# Patient Record
Sex: MALE | Race: WHITE
[De-identification: names, ages, dates, MRNs, and addresses within clinical notes are randomized per-mention and may not be internally consistent; named-entity substitution may affect disease eponyms.]

---

## 2018-06-25 ENCOUNTER — HOSPITAL ENCOUNTER (EMERGENCY)
Dept: HOSPITAL 41 - JD.ED | Age: 53
Discharge: HOME | End: 2018-06-25
Payer: COMMERCIAL

## 2018-06-25 VITALS — SYSTOLIC BLOOD PRESSURE: 143 MMHG | DIASTOLIC BLOOD PRESSURE: 99 MMHG

## 2018-06-25 DIAGNOSIS — F17.210: ICD-10-CM

## 2018-06-25 DIAGNOSIS — I30.9: Primary | ICD-10-CM

## 2018-06-25 DIAGNOSIS — Z79.899: ICD-10-CM

## 2018-06-25 PROCEDURE — 93306 TTE W/DOPPLER COMPLETE: CPT

## 2018-06-25 PROCEDURE — 84484 ASSAY OF TROPONIN QUANT: CPT

## 2018-06-25 PROCEDURE — 86140 C-REACTIVE PROTEIN: CPT

## 2018-06-25 PROCEDURE — 82553 CREATINE MB FRACTION: CPT

## 2018-06-25 PROCEDURE — 96366 THER/PROPH/DIAG IV INF ADDON: CPT

## 2018-06-25 PROCEDURE — 71045 X-RAY EXAM CHEST 1 VIEW: CPT

## 2018-06-25 PROCEDURE — 96365 THER/PROPH/DIAG IV INF INIT: CPT

## 2018-06-25 PROCEDURE — 85379 FIBRIN DEGRADATION QUANT: CPT

## 2018-06-25 PROCEDURE — 85730 THROMBOPLASTIN TIME PARTIAL: CPT

## 2018-06-25 PROCEDURE — 85007 BL SMEAR W/DIFF WBC COUNT: CPT

## 2018-06-25 PROCEDURE — 93005 ELECTROCARDIOGRAM TRACING: CPT

## 2018-06-25 PROCEDURE — 85027 COMPLETE CBC AUTOMATED: CPT

## 2018-06-25 PROCEDURE — 36415 COLL VENOUS BLD VENIPUNCTURE: CPT

## 2018-06-25 PROCEDURE — 83880 ASSAY OF NATRIURETIC PEPTIDE: CPT

## 2018-06-25 PROCEDURE — 99285 EMERGENCY DEPT VISIT HI MDM: CPT

## 2018-06-25 PROCEDURE — 80053 COMPREHEN METABOLIC PANEL: CPT

## 2018-06-25 PROCEDURE — 96375 TX/PRO/DX INJ NEW DRUG ADDON: CPT

## 2018-06-25 PROCEDURE — 71275 CT ANGIOGRAPHY CHEST: CPT

## 2018-06-25 PROCEDURE — 86617 LYME DISEASE ANTIBODY: CPT

## 2018-06-25 PROCEDURE — 83735 ASSAY OF MAGNESIUM: CPT

## 2018-06-25 PROCEDURE — 85610 PROTHROMBIN TIME: CPT

## 2018-06-25 NOTE — EDM.PDOC
ED HPI GENERAL MEDICAL PROBLEM





- General


Chief Complaint: Chest Pain


Stated Complaint: CHEST PAIN


Time Seen by Provider: 06/25/18 08:36


Source of Information: Reports: Patient


History Limitations: Reports: No Limitations





- History of Present Illness


INITIAL COMMENTS - FREE TEXT/NARRATIVE: 





53-year-old male presents to the ED with acute onset of central chest pressure 

discomfort that radiates into both the left and right anterior chest. Described 

as a heavy pressure sensation. He reports that it awoke him about 0300 hrs. 

from sleep this morning. Reports she's been having milder forms of chest pain 

pressure worse with exertion over the last 2-3 days. He drives truck for living 

and drove truck over the weekend. He was able to fall back asleep this morning 

off and on. He states the pain initially was 7 or 8 out of 10 and currently is 

down to 3 out of 10 at rest. Little bit more short of breath than usual. Denies 

cough or sputum production. Pain does not radiate into his back neck or 

shoulders. No previous similar process. As mentioned he does drive a truck for 

a living. Sits for prolonged periods of time. He smokes proximal may half-pack 

to pack cigarettes per day.


History of coronary disease. No history of PE or DVT in the past. No previous 

abdominal surgery. Currently taking no medication He states he did break out in 

a mild sweat earlier this morning. Minimal exertion seems to produce sweating 

as well. Patient rarely gets heartburn. He does not take Tums or Rolaids. 

Burping and belching did not seem to relieve his chest discomfort.


Onset: Today


Onset Date: 06/25/18


Onset Time: 03:00


Duration: Hour(s):


Location: Reports: Chest (Central chest pressure discomfort rating across the 

chest to both right and left sides.)


Quality: Reports: Ache, Pressure, Other


Severity: Moderate (Heaviness currently 3 out of 10.)


Improves with: Reports: Rest


Worsens with: Reports: Movement


Context: Reports: Other (Bad pain awoke him about 3:00 this morning from sleep. 

No associated reflux symptoms.).  Denies: Activity, Exercise, Lifting, Sick 

Contact, Trauma


Associated Symptoms: Reports: Chest Pain, Shortness of Breath.  Denies: 

Confusion, Cough (See history of present illness), cough w sputum, Diaphoresis, 

Fever/Chills, Headaches, Loss of Appetite, Malaise, Nausea/Vomiting, Rash, 

Seizure, Syncope, Weakness


Treatments PTA: Reports: Other (see below) (None.)


  ** Chest


Pain Score (Numeric/FACES): 3





- Related Data


 Allergies











Allergy/AdvReac Type Severity Reaction Status Date / Time


 


No Known Allergies Allergy   Verified 01/13/16 08:24











Home Meds: 


 Home Meds





Colchicine 0.6 mg PO BID #28 tablet 06/25/18 [Rx]


Diclofenac Sodium [Voltaren] 50 mg PO TID #24 tab.ec 06/25/18 [Rx]











Past Medical History


HEENT History: Reports: Impaired Vision


Other HEENT History: wears eyeglasses for driving


Gastrointestinal History: Reports: Hemorrhoids


Musculoskeletal History: Reports: Fracture





Social & Family History





- Tobacco Use


Smoking Status *Q: Current Every Day Smoker


Years of Tobacco use: 25


Packs/Tins Daily: 0.5





- Recreational Drug Use


Recreational Drug Use: No





- Living Situation & Occupation


Living situation: Reports: 


Occupation: Employed (Drives truck for living.)





ED ROS GENERAL





- Review of Systems


Review Of Systems: See Below


Constitutional: Reports: Decreased Appetite.  Denies: Fever, Malaise, Weakness, 

Fatigue, Weight Loss


HEENT: Reports: No Symptoms (Did not eat breakfast this morning)


Respiratory: Reports: Shortness of Breath, Pleuritic Chest Pain (Mild shortness 

of breath pain does seem to get worse with deep inspiration.).  Denies: Wheezing

, Cough, Sputum, Hemoptysis, Other


Cardiovascular: Reports: Chest Pain, Dyspnea on Exertion.  Denies: Blood 

Pressure Problem (See history present illness), Claudication, Edema, 

Lightheadedness, Orthopnea, Palpitations


Endocrine: Reports: No Symptoms


GI/Abdominal: Reports: No Symptoms


: Reports: No Symptoms


Musculoskeletal: Reports: No Symptoms


Skin: Reports: No Symptoms


Neurological: Reports: No Symptoms


Psychiatric: Reports: No Symptoms


Hematologic/Lymphatic: Reports: No Symptoms


Immunologic: Reports: No Symptoms





ED EXAM, GENERAL





- Physical Exam


Exam: See Below


Exam Limited By: No Limitations


General Appearance: Alert, WD/WN, Anxious, Moderate Distress


Eye Exam: Bilateral Eye: Normal Inspection


Neck: Normal Inspection, Supple, Full Range of Motion.  No: Carotid Bruit, 

Lymphadenopathy (L), Lymphadenopathy (R)


Respiratory/Chest: No Respiratory Distress, Lungs Clear, Normal Breath Sounds, 

No Accessory Muscle Use


Cardiovascular: Normal Peripheral Pulses, Regular Rate, Rhythm, No Edema, No 

Gallop, No Murmur, No Rub


Peripheral Pulses: 2+: Posterior Tibial (L), Posterior Tibial (R), Dorsalis 

Pedis (L), Dorsalis Pedis (R)


GI/Abdominal: Normal Bowel Sounds, Soft, Non-Tender, No Organomegaly, No 

Abnormal Bruit, No Mass, Pelvis Stable, Other (No scars.)


Back Exam: Normal Inspection, Full Range of Motion.  No: CVA Tenderness (L), 

CVA Tenderness (R)


Extremities: Normal Inspection, Normal Range of Motion, Non-Tender, No Pedal 

Edema


Neurological: Alert, CN II-XII Intact, Normal Cognition


Psychiatric: Normal Mood, Anxious


Skin Exam: Warm, Dry, Intact, Normal Color, No Rash





EKG INTERPRETATION


EKG Date: 06/25/18


Time: 08:30


Rhythm: Other


Rate (Beats/Min): 106


Axis: Normal


P-Wave: Present


QRS: Normal


ST-T: Other (There is T-wave inversion in lead 3 and slight T-wave inversion in 

aVF. Nonspecific)


QT: Normal


EKG Interpretation Comments: 





Borderline ECG no definitive ischemia identified





Course





- Vital Signs


Last Recorded V/S: 


 Last Vital Signs











Temp  37.4 C   06/25/18 08:28


 


Pulse  105 H  06/25/18 08:28


 


Resp  33 H  06/25/18 08:28


 


BP  143/99 H  06/25/18 08:28


 


Pulse Ox  96   06/25/18 08:28














- Orders/Labs/Meds


Orders: 


 Active Orders 24 hr











 Category Date Time Status


 


 EKG Documentation Completion [RC] STAT Care  06/25/18 08:43 Active


 


 Oxygen Therapy [RC] ASDIRECTED Care  06/25/18 08:42 Active


 


 Echo Comp wo Cont [US] Stat Exams  06/25/18 10:55 Ordered


 


 Nitroglycerin/D5W [Nitroglycerin  25 MG/D5W 250 ML] Med  06/25/18 08:45 Active





 25 mg in 250 ml IV ASDIRECTED   


 


 Sodium Chloride 0.9% [Normal Saline] 1,000 ml Med  06/25/18 08:45 Active





 IV ASDIRECTED   


 


 Sodium Chloride 0.9% [Normal Saline] 100 ml Med  06/25/18 09:45 Active





 IV ASDIRECTED   


 


 Sodium Chloride 0.9% [Saline Flush] Med  06/25/18 09:33 Active





 10 ml FLUSH ONETIME PRN   








 Medication Orders





Sodium Chloride (Normal Saline)  1,000 mls @ 125 mls/hr IV ASDIRECTED LYDIA


   Last Admin: 06/25/18 08:50  Dose: 125 mls/hr


Nitroglycerin/Dextrose (Nitroglycerin  25 Mg/D5w 250 Ml)  25 mg in 250 mls @ 6 

mls/hr IV ASDIRECTED LYDIA


   Last Admin: 06/25/18 09:00  Dose: 10 mcg/min, 6 mls/hr


Sodium Chloride (Normal Saline)  100 mls @ 65 mls/hr IV ASDIRECTED LYDIA


   Last Admin: 06/25/18 09:56  Dose: 65 mls/hr


Sodium Chloride (Saline Flush)  10 ml FLUSH ONETIME PRN


   PRN Reason: IV FLUSH


   Last Admin: 06/25/18 09:55  Dose: 10 ml








Labs: 


 Laboratory Tests











  06/25/18 06/25/18 06/25/18 Range/Units





  08:40 08:40 08:40 


 


WBC  13.65 H    (4.23-9.07)  K/mm3


 


RBC  5.07    (4.63-6.08)  M/mm3


 


Hgb  16.6    (13.7-17.5)  gm/L


 


Hct  47.0    (40.1-51.0)  %


 


MCV  92.7 H    (79.0-92.2)  fl


 


MCH  32.7 H    (25.7-32.2)  pg


 


MCHC  35.3    (32.2-35.5)  g/dl


 


RDW Std Deviation  44.4 H    (35.1-43.9)  fL


 


Plt Count  219    (163-337)  K/mm3


 


MPV  10.7    (9.4-12.3)  fl


 


Neutrophils % (Manual)  71 H    (40-60)  %


 


Band Neutrophils %  0    (0-10)  %


 


Lymphocytes % (Manual)  23    (20-40)  %


 


Atypical Lymphs %  0    %


 


Monocytes % (Manual)  4    (2-10)  %


 


Eosinophils % (Manual)  2    (0.8-7.0)  %


 


Basophils % (Manual)  0 L    (0.2-1.2)  


 


Platelet Estimate  Adequate    


 


RBC Morph Comment  Normal    


 


PT   10.9   (9.5-12.1)  SECONDS


 


INR   1.00   


 


APTT     (24-31)  SECONDS


 


D-Dimer, Quantitative     (0.19-0.50)  mg/L


 


Sodium    135 L  (136-145)  mEq/L


 


Potassium    4.2  (3.5-5.1)  mEq/L


 


Chloride    102  ()  mEq/L


 


Carbon Dioxide    21  (21-32)  mEq/L


 


Anion Gap    16.2 H  (5-15)  


 


BUN    12  (7-18)  mg/dL


 


Creatinine    1.0  (0.7-1.3)  mg/dL


 


Est Cr Clr Drug Dosing    79.87  mL/min


 


Estimated GFR (MDRD)    > 60  (>60)  mL/min


 


BUN/Creatinine Ratio    12.0 L  (14-18)  


 


Glucose    110 H  ()  mg/dL


 


Calcium    8.7  (8.5-10.1)  mg/dL


 


Magnesium    1.8  (1.8-2.4)  mg/dl


 


Total Bilirubin    0.7  (0.2-1.0)  mg/dL


 


AST    16  (15-37)  U/L


 


ALT    28  (16-63)  U/L


 


Alkaline Phosphatase    111  ()  U/L


 


CK-MB (CK-2)    0.7  (0-3.6)  ng/ml


 


Troponin I    < 0.017  (0.00-0.056)  ng/mL


 


C-Reactive Protein    5.1 H*  (<1.0)  mg/dL


 


NT-Pro-B Natriuret Pep     (0-125)  pg/mL


 


Total Protein    7.4  (6.4-8.2)  g/dl


 


Albumin    3.4  (3.4-5.0)  g/dl


 


Globulin    4.0  gm/dL


 


Albumin/Globulin Ratio    0.9 L  (1-2)  














  06/25/18 06/25/18 06/25/18 Range/Units





  08:40 08:40 08:40 


 


WBC     (4.23-9.07)  K/mm3


 


RBC     (4.63-6.08)  M/mm3


 


Hgb     (13.7-17.5)  gm/L


 


Hct     (40.1-51.0)  %


 


MCV     (79.0-92.2)  fl


 


MCH     (25.7-32.2)  pg


 


MCHC     (32.2-35.5)  g/dl


 


RDW Std Deviation     (35.1-43.9)  fL


 


Plt Count     (163-337)  K/mm3


 


MPV     (9.4-12.3)  fl


 


Neutrophils % (Manual)     (40-60)  %


 


Band Neutrophils %     (0-10)  %


 


Lymphocytes % (Manual)     (20-40)  %


 


Atypical Lymphs %     %


 


Monocytes % (Manual)     (2-10)  %


 


Eosinophils % (Manual)     (0.8-7.0)  %


 


Basophils % (Manual)     (0.2-1.2)  


 


Platelet Estimate     


 


RBC Morph Comment     


 


PT     (9.5-12.1)  SECONDS


 


INR     


 


APTT  29    (24-31)  SECONDS


 


D-Dimer, Quantitative    1.40 H  (0.19-0.50)  mg/L


 


Sodium     (136-145)  mEq/L


 


Potassium     (3.5-5.1)  mEq/L


 


Chloride     ()  mEq/L


 


Carbon Dioxide     (21-32)  mEq/L


 


Anion Gap     (5-15)  


 


BUN     (7-18)  mg/dL


 


Creatinine     (0.7-1.3)  mg/dL


 


Est Cr Clr Drug Dosing     mL/min


 


Estimated GFR (MDRD)     (>60)  mL/min


 


BUN/Creatinine Ratio     (14-18)  


 


Glucose     ()  mg/dL


 


Calcium     (8.5-10.1)  mg/dL


 


Magnesium     (1.8-2.4)  mg/dl


 


Total Bilirubin     (0.2-1.0)  mg/dL


 


AST     (15-37)  U/L


 


ALT     (16-63)  U/L


 


Alkaline Phosphatase     ()  U/L


 


CK-MB (CK-2)     (0-3.6)  ng/ml


 


Troponin I     (0.00-0.056)  ng/mL


 


C-Reactive Protein     (<1.0)  mg/dL


 


NT-Pro-B Natriuret Pep   48   (0-125)  pg/mL


 


Total Protein     (6.4-8.2)  g/dl


 


Albumin     (3.4-5.0)  g/dl


 


Globulin     gm/dL


 


Albumin/Globulin Ratio     (1-2)  











Meds: 


Medications











Generic Name Dose Route Start Last Admin





  Trade Name Freq  PRN Reason Stop Dose Admin


 


Sodium Chloride  1,000 mls @ 125 mls/hr  06/25/18 08:45  06/25/18 08:50





  Normal Saline  IV   125 mls/hr





  ASDIRECTED LYDIA   Administration





     





     





     





     


 


Nitroglycerin/Dextrose  25 mg in 250 mls @ 6 mls/hr  06/25/18 08:45  06/25/18 09

:00





  Nitroglycerin  25 Mg/D5w 250 Ml  IV   10 mcg/min





  ASDIRECTED LYDIA   6 mls/hr





     Administration





     





     





  10 MCG/MIN   


 


Sodium Chloride  100 mls @ 65 mls/hr  06/25/18 09:45  06/25/18 09:56





  Normal Saline  IV   65 mls/hr





  ASDIRECTED LYDIA   Administration





     





     





     





     


 


Sodium Chloride  10 ml  06/25/18 09:33  06/25/18 09:55





  Saline Flush  FLUSH   10 ml





  ONETIME PRN   Administration





  IV FLUSH   





     





     





     














Discontinued Medications














Generic Name Dose Route Start Last Admin





  Trade Name Derikq  PRN Reason Stop Dose Admin


 


Aspirin  324 mg  06/25/18 08:41  06/25/18 08:50





  Aspirin  PO  06/25/18 08:42  324 mg





  ONETIME ONE   Administration





     





     





     





     


 


Furosemide  40 mg  06/25/18 09:28  06/25/18 09:44





  Lasix  IVPUSH  06/25/18 09:29  40 mg





  NOW ONE   Administration





     





     





     





     


 


Hydromorphone HCl  0.5 mg  06/25/18 08:42  06/25/18 08:52





  Dilaudid  IVPUSH  06/25/18 08:43  0.5 mg





  ONETIME ONE   Administration





     





     





     





     


 


Iopamidol  100 ml  06/25/18 09:33  06/25/18 09:55





  Isovue-370 (76%)  IVPUSH  06/25/18 09:34  100 ml





  ONETIME ONE   Administration





     





     





     





     


 


Iopamidol  50 ml  06/25/18 09:34  06/25/18 09:55





  Isovue-370 (76%)  IVPUSH  06/25/18 09:35  50 ml





  ONETIME ONE   Administration





     





     





     





     


 


Ondansetron HCl  4 mg  06/25/18 08:42  06/25/18 08:51





  Zofran  IVPUSH  06/25/18 08:43  4 mg





  ONETIME ONE   Administration





     





     





     





     














- Radiology Interpretation


Free Text/Narrative:: 


53-year-old male presents the ED with central chest pressure discomfort 

wakening from sleep about 3:00 this morning with it. Radiates across both 

anterior right & left sides of his chest. Described as a heavy pressure 

sensation. Worsens with exertion. He's had similar type pain off and on over 

the weekend for the last 3 days. Denies cough or sputum production. Drives a 

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 Smokes 

approximately half pack to pack cigarettes per day. Examination is essentially 

normal. ECG shows sinus tachycardia at 10 6/m with most definitive signs of 

ischemia. There is minimal T-wave changes in leads 3 and aVF. And he will be 

treated for acute coronary syndrome. Aspirin 324 mg chewed. Nitro drip at 10 mcg

/m. Will receive Dilaudid 0.5 mg IV for pain relief with Zofran 4 mg IV for 

nausea. Chest x-ray routine labs including cardiac markers to be done.








- Re-Assessments/Exams


Free Text/Narrative Re-Assessment/Exam: 





06/25/18 09:27 chest x-ray reveals poor inspirational view. Silhouette is upper 

limits of normal in size. There is a diffuse vascular congestion pattern.








06/25/18 09:30 Labs reveal an elevated white count at 13.65 with a 71% 

neutrophils and no bands reported. Hemoglobin is 16.6 hematocrit of 47.0. 

Platelet count is 219,000. PT is 10.9 with an INR 1.00. PTT is 29. D-dimer is 

mildly elevated at 1.40. Sodium is 135 slightly low. His potassium normal at 

4.2. Chloride is 102 with a bicarbonate of 21. Anion gap is mildly elevated at 

16.2. BUN is 12 with a creatinine of 1.0. GFR is greater than 60. Glucose is 

110. Calcium is 8.7. Magnesium low normal at 1.8. Liver function appears to be 

normal. Initial cardiac markers show troponin I of less than 0.017. C-reactive 

protein is 5.1. We'll have CT pulmonary angiogram performed. Since cardiac 

markers are rafael





06/25/18 09:43  Patient has no pain at present. He was advised of the findings 

and reports of his labs. No signs of myocardial infarction. With his elevated d-

dimer and his risk of VT from his work he will have a CT pulmonary angiogram 

performed.





06/25/18 10:30 CT pulmonary angiogram has been completed. Radiologist reports 

no pulmonary emboli visualized. He comments that there is a small to moderate 

sized pericardial effusion. Mild coronary artery calcification is appreciated. 

There is a haziness in the lower lung fields which he suggests is either due to 

mild fibrosis or atelectasis. Pleural effusions or pneumothorax is seen. BNP is 

pending.





06/25/18 10:34  BNP returned in the normal range at 48. Therefore we have 

evidence clinically of a an acute pericarditis with an elevated white count and 

elevated CRP. This is accounting for shortness of breath and chest pain. Will 

discuss case with Dr. Castillo.





06/25/18 10:57  spoke with Dr. Salazar cardiologist at [facility] in 

[place] about the findings. He suggests at this point time an echocardiogram 

would be indicated. He suggests treatment is colchicine 0.6 mg twice a day for 

the next 14 days. Landed I will see we can get an echocardiogram done this 

morning while he is here in the ED. They are usually over read by cardiology 

and I will have to try and have it sent to Dr. Salazar.











Departure





- Departure


Time of Disposition: 12:49


Disposition: Home, Self-Care 01


Condition: Fair


Clinical Impression: 


Acute pericarditis


Qualifiers:


 Pericarditis type: unspecified type Qualified Code(s): I30.9 - Acute 

pericarditis, unspecified





Prescriptions: 


Colchicine 0.6 mg PO BID #28 tablet


Diclofenac Sodium [Voltaren] 50 mg PO TID #24 tab.ec


Instructions:  Pericarditis


Referrals: 


PCP,None [Primary Care Provider] - 


Forms:  ED Department Discharge


Additional Instructions: 


Evaluation the emergency room today in regards to development of severe 

retrosternal chest pressure discomfort at 3:00 this morning that awoke him from 

sleep. Intermittent pain in the chest for the last 3-4 days. Worsened by 

bending over. Investigations revealed no significant changes in your 

electrocardiogram her heart tracing. Also labs done did not show any signs of 

heart attack. Show an elevated d-dimer which is a marker for blood clot 

formation. Because it was elevated to CT of your chest was performed. This 

revealed that there was increased fluid in the sac around your heart called 

pericardium the cause of the increased fluid is inflammation of the pericardium 

call pericarditis. The exact cause of this is unclear it almost always however 

viral. An echocardiogram was done of your heart and sent off to the heart 

specialist for reading. Treatment is to take Voltaren 50 mg 3 times daily for 

the next 8 days to relieve pain and inflammation. Second medicine is colchicine 

0.6 mg one tablet twice daily for the next 2 weeks to settle down the 

inflammation of the lining of the heart. I have phone to make an appointment 

with Dr. Salazar cardiologist in [place] although he may not be available for 

follow-up in the next 10-14 days. Therefore the cardiology department will give 

you a call with a follow-up appointment with either Dr. Salazar or one of his 

colleagues. They have all your information. Return to medical care if any 

further problems occur.





- My Orders


Last 24 Hours: 


My Active Orders





06/25/18 08:42


Oxygen Therapy [RC] ASDIRECTED 





06/25/18 08:43


EKG Documentation Completion [RC] STAT 





06/25/18 08:45


Nitroglycerin/D5W [Nitroglycerin  25 MG/D5W 250 ML] 25 mg in 250 ml IV 

ASDIRECTED 


Sodium Chloride 0.9% [Normal Saline] 1,000 ml IV ASDIRECTED 





06/25/18 09:33


Sodium Chloride 0.9% [Saline Flush]   10 ml FLUSH ONETIME PRN 





06/25/18 09:45


Sodium Chloride 0.9% [Normal Saline] 100 ml IV ASDIRECTED 





06/25/18 10:55


Echo Comp wo Cont [US] Stat 














- Assessment/Plan


Last 24 Hours: 


My Active Orders





06/25/18 08:42


Oxygen Therapy [RC] ASDIRECTED 





06/25/18 08:43


EKG Documentation Completion [RC] STAT 





06/25/18 08:45


Nitroglycerin/D5W [Nitroglycerin  25 MG/D5W 250 ML] 25 mg in 250 ml IV 

ASDIRECTED 


Sodium Chloride 0.9% [Normal Saline] 1,000 ml IV ASDIRECTED 





06/25/18 09:33


Sodium Chloride 0.9% [Saline Flush]   10 ml FLUSH ONETIME PRN 





06/25/18 09:45


Sodium Chloride 0.9% [Normal Saline] 100 ml IV ASDIRECTED 





06/25/18 10:55


Echo Comp wo Cont [US] Stat

## 2018-06-25 NOTE — CT
CT chest

 

Technique: Multiple axial sections through the chest were obtained.  

Intravenous contrast was utilized.

 

Comparison: No previous CT chest is available, prior chest x-ray 

performed earlier on the same day (8:47 AM)

 

Findings: Small to moderate sized pericardial effusion is seen.  Mild 

coronary artery calcification is noted.  Small portion of the 

visualized upper abdominal structures are within normal limits.  No 

mediastinal mass is seen.

 

Pulmonary arteries are fairly well-opacified.  No filling defects are 

seen to indicate pulmonary embolism.  Mild increased density within 

both lung bases as well as posteriorly within both lungs most likely 

representing a combination of mild fibrosis and atelectasis.  Lungs 

otherwise are clear.  No pleural effusions or pneumothorax is seen.

 

Bone window settings were reviewed which show scattered degenerative 

change within the spine.  Nothing acute is appreciated within the 

osseous structures.

 

Impression:

1.  No findings of pulmonary embolism.

2.  Small to moderate sized pericardial effusion.

3.  Other incidental findings.

 

Diagnostic code #3

## 2018-06-25 NOTE — CR
Chest: Frontal view of the chest was obtained utilizing portable 

technique.

 

Comparison: Prior chest x-ray of 01/13/16.

 

Heart size is normal.  Tortuous thoracic aorta is noted.  Lungs are 

clear with no acute parenchymal change.  Bony structures are grossly 

intact.

 

Impression:

1.  Nothing acute is seen on frontal chest x-ray.

 

Diagnostic code #1